# Patient Record
Sex: FEMALE | Race: WHITE
[De-identification: names, ages, dates, MRNs, and addresses within clinical notes are randomized per-mention and may not be internally consistent; named-entity substitution may affect disease eponyms.]

---

## 2019-04-04 ENCOUNTER — HOSPITAL ENCOUNTER (EMERGENCY)
Dept: HOSPITAL 91 - E/R | Age: 54
Discharge: HOME | End: 2019-04-04
Payer: COMMERCIAL

## 2019-04-04 ENCOUNTER — HOSPITAL ENCOUNTER (EMERGENCY)
Dept: HOSPITAL 10 - E/R | Age: 54
Discharge: HOME | End: 2019-04-04
Payer: COMMERCIAL

## 2019-04-04 VITALS
WEIGHT: 138.45 LBS | BODY MASS INDEX: 26.14 KG/M2 | HEIGHT: 61 IN | BODY MASS INDEX: 26.14 KG/M2 | WEIGHT: 138.45 LBS | HEIGHT: 61 IN

## 2019-04-04 VITALS — SYSTOLIC BLOOD PRESSURE: 121 MMHG | DIASTOLIC BLOOD PRESSURE: 61 MMHG | RESPIRATION RATE: 17 BRPM | HEART RATE: 60 BPM

## 2019-04-04 DIAGNOSIS — D72.829: Primary | ICD-10-CM

## 2019-04-04 DIAGNOSIS — F17.200: ICD-10-CM

## 2019-04-04 DIAGNOSIS — F41.9: ICD-10-CM

## 2019-04-04 LAB
ADD MAN DIFF?: NO
ANION GAP: 10 (ref 5–13)
BASOPHIL #: 0.1 10^3/UL (ref 0–0.1)
BASOPHILS %: 0.7 % (ref 0–2)
BLOOD UREA NITROGEN: 11 MG/DL (ref 7–20)
CALCIUM: 9.1 MG/DL (ref 8.4–10.2)
CARBON DIOXIDE: 23 MMOL/L (ref 21–31)
CHLORIDE: 107 MMOL/L (ref 97–110)
CREATININE: 0.49 MG/DL (ref 0.44–1)
EOSINOPHILS #: 0.3 10^3/UL (ref 0–0.5)
EOSINOPHILS %: 2.2 % (ref 0–7)
GLUCOSE: 96 MG/DL (ref 70–220)
HEMATOCRIT: 40 % (ref 37–47)
HEMOGLOBIN: 13.2 G/DL (ref 12–16)
IMMATURE GRANS #M: 0.06 10^3/UL (ref 0–0.03)
IMMATURE GRANS % (M): 0.4 % (ref 0–0.43)
LYMPHOCYTES #: 4.2 10^3/UL (ref 0.8–2.9)
LYMPHOCYTES %: 29.9 % (ref 15–51)
MEAN CORPUSCULAR HEMOGLOBIN: 29.7 PG (ref 29–33)
MEAN CORPUSCULAR HGB CONC: 33 G/DL (ref 32–37)
MEAN CORPUSCULAR VOLUME: 90.1 FL (ref 82–101)
MEAN PLATELET VOLUME: 9.3 FL (ref 7.4–10.4)
MONOCYTE #: 0.7 10^3/UL (ref 0.3–0.9)
MONOCYTES %: 5.1 % (ref 0–11)
NEUTROPHIL #: 8.6 10^3/UL (ref 1.6–7.5)
NEUTROPHILS %: 61.7 % (ref 39–77)
NUCLEATED RED BLOOD CELLS #: 0 10^3/UL (ref 0–0)
NUCLEATED RED BLOOD CELLS%: 0 /100WBC (ref 0–0)
PLATELET COUNT: 298 10^3/UL (ref 140–415)
POTASSIUM: 3.8 MMOL/L (ref 3.5–5.1)
RED BLOOD COUNT: 4.44 10^6/UL (ref 4.2–5.4)
RED CELL DISTRIBUTION WIDTH: 12.6 % (ref 11.5–14.5)
SODIUM: 140 MMOL/L (ref 135–144)
TROPONIN-I: < 0.012 NG/ML (ref 0–0.12)
WHITE BLOOD COUNT: 14 10^3/UL (ref 4.8–10.8)

## 2019-04-04 PROCEDURE — 84484 ASSAY OF TROPONIN QUANT: CPT

## 2019-04-04 PROCEDURE — 71045 X-RAY EXAM CHEST 1 VIEW: CPT

## 2019-04-04 PROCEDURE — 85025 COMPLETE CBC W/AUTO DIFF WBC: CPT

## 2019-04-04 PROCEDURE — 93005 ELECTROCARDIOGRAM TRACING: CPT

## 2019-04-04 PROCEDURE — 36415 COLL VENOUS BLD VENIPUNCTURE: CPT

## 2019-04-04 PROCEDURE — 99285 EMERGENCY DEPT VISIT HI MDM: CPT

## 2019-04-04 PROCEDURE — 80048 BASIC METABOLIC PNL TOTAL CA: CPT

## 2019-04-04 NOTE — ERD
ER Documentation


Chief Complaint


Chief Complaint





PT C/O PRESSURE LIKE CP WITH SOB X1  HOUR.NO HX





HPI


This is a 53-year-old female with a past medical history of hyperlipidemia, 


anxiety with previous panic attacks who is presenting with pressure-like chest 


pain, palpitations, hyperventilating with associated shortness of breath begi


nning approximately 1 hour prior to arrival.  The patient's symptoms have since 


improved.  The patient denies any diaphoresis.  She has not had any 


lightheadedness or dizziness.  She has not had nausea or vomiting.





The patient denies feeling sick recently.  The patient denies fever or chills.  


The patient has had no headache or vision changes.  The patient does not endorse


neck or back pain.  The patient denies abdominal pain. The patient denies 


changes to bowel movements or urination.  The patient has had no focal deficits.


 The patient has had no weakness or numbness or tingling to the face or 


extremities.





ROS


All systems reviewed and are negative except as per history of present illness.





Allergies


Allergies:  


Coded Allergies:  


     No Known Allergy (Unverified , 4/4/19)





PMhx/Soc


Medical and Surgical Hx:  pt denies Surgical Hx


Hx Cardiac Disorders:  Yes (Hyperlipidemia)


Hx Psychiatric Problems:  Yes (panic attacks)


Hx Miscellaneous Medical Probl:  Yes (hemmerroids, rectal prolapse- resolved)


Hx Alcohol Use:  No


Hx Substance Use:  No


Hx Tobacco Use:  Yes


Smoking Status:  Current every day smoker





Physical Exam


Vitals





Vital Signs


  Date      Temp  Pulse  Resp  B/P (MAP)   Pulse Ox  O2          O2 Flow    FiO2


Time                                                 Delivery    Rate


    4/4/19           56    16      136/66        98  Room Air


     04:40                           (89)


    4/4/19  97.8     66    16      145/65        99


     03:29                           (91)





Physical Exam


Const:   No apparent distress, well-developed, well-nourished


Head:   Normocephalic, Atraumatic 


Eyes:   Normal Conjunctiva.  Extraocular movements intact. Pupils equal, round 


and reactive to light


ENT:   Normal External Ears, Nose and Mouth.


Neck:   Full range of motion. No meningismus.


Resp:   Clear to auscultation bilaterally, No wheezes, rales or rhonchi


Cardio:   Regular rate and rhythm. No murmurs, rubs or gallops


Abd:   Soft, non tender, non distended. Normal bowel sounds


Skin:   No petechiae or rashes


Back:   No midline tenderness. No CVA tenderness


Ext:   No cyanosis, or edema


Neur:   Awake and alert, oriented 4.  Cranial nerves intact.  No facial droop. 


Normal strength, sensation and coordination.


Psych:   Anxious


Result Diagram:  


4/4/19 0455                                                                     


          4/4/19 0455





Results 24 hrs





Laboratory Tests


              Test
                                  4/4/19
04:55


              White Blood Count                     14.0 10^3/ul


              Red Blood Count                       4.44 10^6/ul


              Hemoglobin                               13.2 g/dl


              Hematocrit                                  40.0 %


              Mean Corpuscular Volume                    90.1 fl


              Mean Corpuscular Hemoglobin                29.7 pg


              Mean Corpuscular Hemoglobin
Concent     33.0 g/dl 



              Red Cell Distribution Width                 12.6 %


              Platelet Count                         298 10^3/UL


              Mean Platelet Volume                        9.3 fl


              Immature Granulocytes %                    0.400 %


              Neutrophils %                               61.7 %


              Lymphocytes %                               29.9 %


              Monocytes %                                  5.1 %


              Eosinophils %                                2.2 %


              Basophils %                                  0.7 %


              Nucleated Red Blood Cells %            0.0 /100WBC


              Immature Granulocytes #              0.060 10^3/ul


              Neutrophils #                          8.6 10^3/ul


              Lymphocytes #                          4.2 10^3/ul


              Monocytes #                            0.7 10^3/ul


              Eosinophils #                          0.3 10^3/ul


              Basophils #                            0.1 10^3/ul


              Nucleated Red Blood Cells #            0.0 10^3/ul


              Sodium Level                            140 mmol/L


              Potassium Level                         3.8 mmol/L


              Chloride Level                          107 mmol/L


              Carbon Dioxide Level                     23 mmol/L


              Anion Gap                                       10


              Blood Urea Nitrogen                       11 mg/dl


              Creatinine                              0.49 mg/dl


              Est Glomerular Filtrat Rate
mL/min   > 60 mL/min 



              Glucose Level                             96 mg/dl


              Calcium Level                            9.1 mg/dl


              Troponin I                           < 0.012 ng/ml








Procedures/[facility]


MDM





The patient's presentation warrants further investigation. Previous medical 


records, if available, were reviewed.





LABS





The patient's laboratory testing was obtained and reviewed. No emergent 


treatment was required unless described below.





CBC:   Leukocytosis without shift, likely reactive.  No E/o anemia or 


thrombocytopenia


Chemistry:   No E/o severe acidosis or alkalosis or renal failure or diabetic ke


toacidosis


Troponin:   No E/o acute ischemia





EKG





EKG read by me: 


Rate/Rhythm:    Regular rate and rhythm at a rate of 69 bpm


Intervals:    Normal


Axis:    Normal


Impression:    No evidence of acute ischemia or arrhythmia





IMAGING





Imaging and Radiology interpretation reviewed.





CXR


FINDINGS: Cardiomediastinal silhouette is normal. Pulmonary vasculature is 


normal. Lungs and costophrenic angles are clear. Bones and soft tissues 


unremarkable.


IMPRESSION: No evidence for active cardiopulmonary disease. 


Electronically viewed and signed by Physician Kauhsik on 04/04/2019 05:39 





TREATMENT/DISPOSITION





The patient presents for transient episode of chest pain.  The patient does have


a history of anxiety, and anxiety is certainly a possibility today.  Given her 


age, a cardiac workup was completed.  The patient's chest xray does not reveal 


pneumonia or pneumothorax or pleural effusions or pulmonary edema. The patient 


does not have a widened mediastinum and does not have signs or symptoms concerni


ng for thoracic aortic aneurysm or dissection. The patient does not have 


pneumomediastinum or signs concerning for esophageal tear or rupture. The 


patient has no clinical or radiographic signs of pericardial effusion or 


tamponade.  The patient does not have pneumoperitoneum and I have decreased susp


icion of viscus perforation as possible referred pain.





The patient does not have a history of heart failure and I have low suspicion 


for this. The patient does not have a diagnosis of COPD and is not wheezing 


today. The patient is not tachypneic or hypoxic. The patient is breathing 


comfortably and without pleuritic pain. The patient is not on hormonal therapy. 


The patient has no history of clotting or bleeding disorders. The patient has no


calf tenderness. The patient has had no hemoptysis.  I have decreased suspicion 


for PE. The patient's troponin and EKG are reassuring. I have low suspicion for 


acute coronary syndrome. 





The patient's HEART score is equal to or less than 3. This stratifies the 


patient into the low risk (<1%) group for an major adverse cardiac event within 


the next 30 days. Shared decision making was enacted. The risks and benefits of 


admission and discharge were discussed with the patient and it was ultimately 


decided that the patient would be discharged with close outpatient follow up and


evaluation for functional testing within 72 hours.





DISCHARGE





Upon reevaluation of the patient, symptoms have improved. No emergent diagnoses 


were identified. At this time, I feel that the patient stable for discharge.  


The patient was instructed to follow-up with a primary care physician in 1-3 


days.  The patient will be given strict precautions with which to return to the 


emergency department.





Prescriptions: None





The patient's blood pressure was elevated at greater than 120/80 while in the 


emergency department.  The patient was otherwise stable with no evidence of 


hypertensive urgency or emergency.  The patient does not require admission for 


blood pressure control. I have discussed with the patient the risks of 


hypertension. I have instructed the patient to return to the ER for any new or 


worsening symptoms including chest pain, shortness of breath, headache, blurred 


vision, confusion, nausea, vomiting or LOC. I have advised the patient to follow


up with the primary care physician for outpatient monitoring and treatment for 


hypertension in 1-3 days.





Disclaimer: Inadvertent spelling and grammatical errors are likely due to 


EHR/dictation software use and do not reflect on the overall quality of patient 


care. Note that the electronic time recorded on this note does not necessarily 


reflect the actual time of the patient encounter.





Departure


Diagnosis:  


   Primary Impression:  


   Nonspecific chest pain


   Additional Impressions:  


   Anxiousness


   Leukocytosis


   Leukocytosis type:  unspecified  Qualified Codes:  D72.829 - Elevated white 


   blood cell count, unspecified


Condition:  Stable


Patient Instructions:  Chest Pain, Uncertain Cause





Additional Instructions:  


Thank you for for coming to [facility] for your care today. 


Please ask your nurse or provider if you have questions about your care today 


and do not leave until all your questions have been answered.  Please use any 


medications given as directed and follow-up with your doctor (or the doctor you 


were referred to) in the next 1-3 days. If you do not have a primary care doctor


you may follow up at the [facility] or Critical access hospital clinic (listed below). You


may also use motrin and tylenol as needed for fever and/or pain unless 


instructed otherwise by your provider or nurse. Indications for more urgent 


follow-up have been discussed, but you may return to the Emergency Department at


ANY time for any worrisome or worsening symptoms.





If you have abdominal pain, please know that no test or exam you received is 


perfect and you should follow up within 8 hours for continued pain.





If you had any imaging studies today, such as an X-Ray or CT Scan, these studies


will be reviewed later by a radiologist. You will be called if there are 


important findings that were not identified today, so make sure the contact 


information you provided at registration is correct.





If you received any narcotic pain control medicine today, such as Vicodin, 


Morphine or Dilaudid, your coordination and judgment may be affected for a 


number of hours. Please do not drive or operate heavy machinery, and you may 


want someone to assist you at home. If you were given a prescription for 


narcotic medication, be aware that it is very addictive- use sparingly and only 


if necessary.





PLEASE SEEK FURTHER EVALUATION AND MANAGEMENT AT YOUR DOCTORS OFFICE WITHIN THE 


NEXT 1-3 DAYS. IT IS YOUR RESPONSIBILITY TO MAKE AN APPOINTMENT FOR FOLOW-UP 


CARE.





IF YOU HAVE A PRIMARY DOCTOR, PLEASE CALL THEIR OFFICE TO SCHEDULE AN 


APPOINTMENT FOR FOLLOW UP.





IF YOU DO NOT HAVE A PRIMARY DOCTOR YOU CAN CALL OUR PHYSICIAN REFERRAL HOTLINE 


AT [phone] 





IF YOU CAN NOT AFFORD TO SEE A PHYSICIAN YOU CAN CHOSE FROM THE FOLLOWING 


[facility] CLINICS:





[facility] [phone] [address] [facility]. [facility] [phone] [address] [facility]. [facility] [phone] [address] [facility] [phone] [address] [facility]. [facility] [phone] [address] [facility]. [facility]. [phone] [address] LEIDA CLARK MD               Apr 4, 2019 06:23

## 2019-04-22 ENCOUNTER — HOSPITAL ENCOUNTER (EMERGENCY)
Dept: HOSPITAL 10 - FTE | Age: 54
Discharge: LEFT BEFORE DISCHARGE | End: 2019-04-22
Payer: SELF-PAY

## 2019-04-22 ENCOUNTER — HOSPITAL ENCOUNTER (EMERGENCY)
Dept: HOSPITAL 91 - FTE | Age: 54
Discharge: LEFT BEFORE DISCHARGE | End: 2019-04-22
Payer: SELF-PAY

## 2019-04-22 VITALS — DIASTOLIC BLOOD PRESSURE: 80 MMHG | HEART RATE: 73 BPM | SYSTOLIC BLOOD PRESSURE: 161 MMHG | RESPIRATION RATE: 19 BRPM

## 2019-04-22 VITALS
HEIGHT: 62 IN | WEIGHT: 139.33 LBS | HEIGHT: 62 IN | WEIGHT: 139.33 LBS | BODY MASS INDEX: 25.64 KG/M2 | BODY MASS INDEX: 25.64 KG/M2

## 2019-04-22 DIAGNOSIS — Z53.21: Primary | ICD-10-CM

## 2019-04-22 PROCEDURE — 93005 ELECTROCARDIOGRAM TRACING: CPT
